# Patient Record
Sex: FEMALE | Race: WHITE | NOT HISPANIC OR LATINO | Employment: PART TIME | URBAN - METROPOLITAN AREA
[De-identification: names, ages, dates, MRNs, and addresses within clinical notes are randomized per-mention and may not be internally consistent; named-entity substitution may affect disease eponyms.]

---

## 2023-11-30 ENCOUNTER — OFFICE VISIT (OUTPATIENT)
Dept: PAIN MEDICINE | Facility: HOSPITAL | Age: 63
End: 2023-11-30
Payer: COMMERCIAL

## 2023-11-30 DIAGNOSIS — M54.89 VERTEBROGENIC PAIN: Primary | ICD-10-CM

## 2023-11-30 PROCEDURE — 99214 OFFICE O/P EST MOD 30 MIN: CPT | Performed by: ANESTHESIOLOGY

## 2023-11-30 PROCEDURE — 99204 OFFICE O/P NEW MOD 45 MIN: CPT | Performed by: ANESTHESIOLOGY
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
## 2023-12-28 ENCOUNTER — PREP FOR PROCEDURE (OUTPATIENT)
Dept: PAIN MEDICINE | Facility: HOSPITAL | Age: 63
End: 2023-12-28
Payer: COMMERCIAL

## 2023-12-28 DIAGNOSIS — M54.51 VERTEBROGENIC LOW BACK PAIN: Primary | ICD-10-CM

## 2024-01-18 ENCOUNTER — ANESTHESIA EVENT (OUTPATIENT)
Dept: OPERATING ROOM | Facility: HOSPITAL | Age: 64
End: 2024-01-18
Payer: COMMERCIAL

## 2024-01-19 ENCOUNTER — PREP FOR PROCEDURE (OUTPATIENT)
Dept: INTERNAL MEDICINE | Facility: HOSPITAL | Age: 64
End: 2024-01-19

## 2024-01-19 ENCOUNTER — HOSPITAL ENCOUNTER (OUTPATIENT)
Facility: HOSPITAL | Age: 64
Setting detail: OUTPATIENT SURGERY
Discharge: HOME | End: 2024-01-19
Attending: ANESTHESIOLOGY | Admitting: ANESTHESIOLOGY
Payer: COMMERCIAL

## 2024-01-19 ENCOUNTER — ANESTHESIA (OUTPATIENT)
Dept: OPERATING ROOM | Facility: HOSPITAL | Age: 64
End: 2024-01-19
Payer: COMMERCIAL

## 2024-01-19 ENCOUNTER — APPOINTMENT (OUTPATIENT)
Dept: RADIOLOGY | Facility: HOSPITAL | Age: 64
End: 2024-01-19
Payer: COMMERCIAL

## 2024-01-19 VITALS
RESPIRATION RATE: 13 BRPM | SYSTOLIC BLOOD PRESSURE: 140 MMHG | DIASTOLIC BLOOD PRESSURE: 75 MMHG | HEART RATE: 72 BPM | BODY MASS INDEX: 30.96 KG/M2 | WEIGHT: 216.27 LBS | OXYGEN SATURATION: 97 % | HEIGHT: 70 IN | TEMPERATURE: 97.9 F

## 2024-01-19 DIAGNOSIS — G89.18 POSTOPERATIVE PAIN: ICD-10-CM

## 2024-01-19 DIAGNOSIS — M54.51 VERTEBROGENIC LOW BACK PAIN: Primary | ICD-10-CM

## 2024-01-19 DIAGNOSIS — M54.16 LUMBAR RADICULITIS: ICD-10-CM

## 2024-01-19 PROBLEM — F41.9 ANXIETY: Status: ACTIVE | Noted: 2024-01-19

## 2024-01-19 PROBLEM — F32.A DEPRESSION: Status: ACTIVE | Noted: 2024-01-19

## 2024-01-19 PROBLEM — K22.70 BARRETT ESOPHAGUS: Status: ACTIVE | Noted: 2024-01-19

## 2024-01-19 PROBLEM — J45.909 ASTHMA (HHS-HCC): Status: ACTIVE | Noted: 2024-01-19

## 2024-01-19 PROBLEM — G89.4 CHRONIC PAIN DISORDER: Status: ACTIVE | Noted: 2024-01-19

## 2024-01-19 PROBLEM — J18.9 PNEUMONIA: Status: ACTIVE | Noted: 2024-01-19

## 2024-01-19 PROCEDURE — 2500000005 HC RX 250 GENERAL PHARMACY W/O HCPCS: Performed by: ANESTHESIOLOGIST ASSISTANT

## 2024-01-19 PROCEDURE — 7100000010 HC PHASE TWO TIME - EACH INCREMENTAL 1 MINUTE: Performed by: ANESTHESIOLOGY

## 2024-01-19 PROCEDURE — 2500000001 HC RX 250 WO HCPCS SELF ADMINISTERED DRUGS (ALT 637 FOR MEDICARE OP): Performed by: ANESTHESIOLOGY

## 2024-01-19 PROCEDURE — A64712 PR NEUROPLASTY SCIATIC NERVE,OPEN: Performed by: ANESTHESIOLOGIST ASSISTANT

## 2024-01-19 PROCEDURE — 2500000004 HC RX 250 GENERAL PHARMACY W/ HCPCS (ALT 636 FOR OP/ED): Performed by: ANESTHESIOLOGIST ASSISTANT

## 2024-01-19 PROCEDURE — 2500000005 HC RX 250 GENERAL PHARMACY W/O HCPCS: Performed by: ANESTHESIOLOGY

## 2024-01-19 PROCEDURE — 64629 TRML DSTRJ IOS BVN EA ADDL: CPT | Performed by: ANESTHESIOLOGY

## 2024-01-19 PROCEDURE — 64628 TRML DSTRJ IOS BVN 1ST 2 L/S: CPT | Performed by: ANESTHESIOLOGY

## 2024-01-19 PROCEDURE — 2500000004 HC RX 250 GENERAL PHARMACY W/ HCPCS (ALT 636 FOR OP/ED): Performed by: ANESTHESIOLOGY

## 2024-01-19 PROCEDURE — 7100000001 HC RECOVERY ROOM TIME - INITIAL BASE CHARGE: Performed by: ANESTHESIOLOGY

## 2024-01-19 PROCEDURE — 3700000001 HC GENERAL ANESTHESIA TIME - INITIAL BASE CHARGE: Performed by: ANESTHESIOLOGY

## 2024-01-19 PROCEDURE — 2720000007 HC OR 272 NO HCPCS: Performed by: ANESTHESIOLOGY

## 2024-01-19 PROCEDURE — 2500000002 HC RX 250 W HCPCS SELF ADMINISTERED DRUGS (ALT 637 FOR MEDICARE OP, ALT 636 FOR OP/ED): Performed by: ANESTHESIOLOGY

## 2024-01-19 PROCEDURE — 3600000001 HC OR TIME - INITIAL BASE CHARGE - PROCEDURE LEVEL ONE: Performed by: ANESTHESIOLOGY

## 2024-01-19 PROCEDURE — C1889 IMPLANT/INSERT DEVICE, NOC: HCPCS | Performed by: ANESTHESIOLOGY

## 2024-01-19 PROCEDURE — A4217 STERILE WATER/SALINE, 500 ML: HCPCS | Performed by: ANESTHESIOLOGY

## 2024-01-19 PROCEDURE — A64712 PR NEUROPLASTY SCIATIC NERVE,OPEN: Performed by: ANESTHESIOLOGY

## 2024-01-19 PROCEDURE — 3600000006 HC OR TIME - EACH INCREMENTAL 1 MINUTE - PROCEDURE LEVEL ONE: Performed by: ANESTHESIOLOGY

## 2024-01-19 PROCEDURE — 7100000002 HC RECOVERY ROOM TIME - EACH INCREMENTAL 1 MINUTE: Performed by: ANESTHESIOLOGY

## 2024-01-19 PROCEDURE — 7100000009 HC PHASE TWO TIME - INITIAL BASE CHARGE: Performed by: ANESTHESIOLOGY

## 2024-01-19 PROCEDURE — 76000 FLUOROSCOPY <1 HR PHYS/QHP: CPT | Mod: 59

## 2024-01-19 PROCEDURE — 3700000002 HC GENERAL ANESTHESIA TIME - EACH INCREMENTAL 1 MINUTE: Performed by: ANESTHESIOLOGY

## 2024-01-19 RX ORDER — LURASIDONE HYDROCHLORIDE 40 MG/1
40 TABLET, FILM COATED ORAL NIGHTLY
COMMUNITY

## 2024-01-19 RX ORDER — OXYCODONE HYDROCHLORIDE 5 MG/1
5 TABLET ORAL 3 TIMES DAILY PRN
Qty: 15 TABLET | Refills: 0 | Status: SHIPPED | OUTPATIENT
Start: 2024-01-19 | End: 2024-01-26 | Stop reason: SDUPTHER

## 2024-01-19 RX ORDER — LEVOTHYROXINE SODIUM 50 UG/1
50 TABLET ORAL
COMMUNITY

## 2024-01-19 RX ORDER — ONDANSETRON HYDROCHLORIDE 2 MG/ML
4 INJECTION, SOLUTION INTRAVENOUS ONCE AS NEEDED
Status: DISCONTINUED | OUTPATIENT
Start: 2024-01-19 | End: 2024-01-19 | Stop reason: HOSPADM

## 2024-01-19 RX ORDER — ONDANSETRON HYDROCHLORIDE 2 MG/ML
INJECTION, SOLUTION INTRAVENOUS AS NEEDED
Status: DISCONTINUED | OUTPATIENT
Start: 2024-01-19 | End: 2024-01-19

## 2024-01-19 RX ORDER — LIDOCAINE HYDROCHLORIDE 20 MG/ML
INJECTION, SOLUTION EPIDURAL; INFILTRATION; INTRACAUDAL; PERINEURAL AS NEEDED
Status: DISCONTINUED | OUTPATIENT
Start: 2024-01-19 | End: 2024-01-19

## 2024-01-19 RX ORDER — SUCRALFATE 1 G/1
1 TABLET ORAL
COMMUNITY

## 2024-01-19 RX ORDER — MIDAZOLAM HYDROCHLORIDE 1 MG/ML
INJECTION INTRAMUSCULAR; INTRAVENOUS AS NEEDED
Status: DISCONTINUED | OUTPATIENT
Start: 2024-01-19 | End: 2024-01-19

## 2024-01-19 RX ORDER — SODIUM CHLORIDE, SODIUM LACTATE, POTASSIUM CHLORIDE, CALCIUM CHLORIDE 600; 310; 30; 20 MG/100ML; MG/100ML; MG/100ML; MG/100ML
INJECTION, SOLUTION INTRAVENOUS CONTINUOUS PRN
Status: DISCONTINUED | OUTPATIENT
Start: 2024-01-19 | End: 2024-01-19

## 2024-01-19 RX ORDER — SODIUM CHLORIDE, SODIUM LACTATE, POTASSIUM CHLORIDE, CALCIUM CHLORIDE 600; 310; 30; 20 MG/100ML; MG/100ML; MG/100ML; MG/100ML
100 INJECTION, SOLUTION INTRAVENOUS CONTINUOUS
Status: DISCONTINUED | OUTPATIENT
Start: 2024-01-19 | End: 2024-01-19 | Stop reason: HOSPADM

## 2024-01-19 RX ORDER — PROPOFOL 10 MG/ML
INJECTION, EMULSION INTRAVENOUS CONTINUOUS PRN
Status: DISCONTINUED | OUTPATIENT
Start: 2024-01-19 | End: 2024-01-19

## 2024-01-19 RX ORDER — IPRATROPIUM BROMIDE AND ALBUTEROL SULFATE 2.5; .5 MG/3ML; MG/3ML
3 SOLUTION RESPIRATORY (INHALATION) ONCE AS NEEDED
Status: COMPLETED | OUTPATIENT
Start: 2024-01-19 | End: 2024-01-19

## 2024-01-19 RX ORDER — ACETAMINOPHEN 650 MG/1
650 SUPPOSITORY RECTAL ONCE AS NEEDED
Status: COMPLETED | OUTPATIENT
Start: 2024-01-19 | End: 2024-01-19

## 2024-01-19 RX ORDER — BUPIVACAINE HCL/EPINEPHRINE 0.5-1:200K
VIAL (ML) INJECTION AS NEEDED
Status: DISCONTINUED | OUTPATIENT
Start: 2024-01-19 | End: 2024-01-19 | Stop reason: HOSPADM

## 2024-01-19 RX ORDER — HYDROXYZINE PAMOATE 25 MG/1
50 CAPSULE ORAL NIGHTLY PRN
COMMUNITY

## 2024-01-19 RX ORDER — LAMOTRIGINE 150 MG/1
300 TABLET ORAL DAILY
COMMUNITY

## 2024-01-19 RX ORDER — OXYCODONE HYDROCHLORIDE 5 MG/1
5 TABLET ORAL EVERY 4 HOURS PRN
Status: DISCONTINUED | OUTPATIENT
Start: 2024-01-19 | End: 2024-01-19 | Stop reason: HOSPADM

## 2024-01-19 RX ORDER — CEFAZOLIN 1 G/1
INJECTION, POWDER, FOR SOLUTION INTRAVENOUS AS NEEDED
Status: DISCONTINUED | OUTPATIENT
Start: 2024-01-19 | End: 2024-01-19

## 2024-01-19 RX ORDER — CLONAZEPAM 1 MG/1
1 TABLET ORAL 2 TIMES DAILY PRN
COMMUNITY

## 2024-01-19 RX ORDER — TRAZODONE HYDROCHLORIDE 150 MG/1
300 TABLET ORAL NIGHTLY
COMMUNITY

## 2024-01-19 RX ORDER — HYDRALAZINE HYDROCHLORIDE 20 MG/ML
5 INJECTION INTRAMUSCULAR; INTRAVENOUS EVERY 30 MIN PRN
Status: DISCONTINUED | OUTPATIENT
Start: 2024-01-19 | End: 2024-01-19 | Stop reason: HOSPADM

## 2024-01-19 RX ORDER — BUSPIRONE HYDROCHLORIDE 15 MG/1
15 TABLET ORAL 2 TIMES DAILY
COMMUNITY

## 2024-01-19 RX ORDER — ONDANSETRON 4 MG/1
4 TABLET, ORALLY DISINTEGRATING ORAL EVERY 8 HOURS PRN
COMMUNITY

## 2024-01-19 RX ORDER — ACETAMINOPHEN 325 MG/1
975 TABLET ORAL ONCE AS NEEDED
Status: COMPLETED | OUTPATIENT
Start: 2024-01-19 | End: 2024-01-19

## 2024-01-19 RX ORDER — OMEPRAZOLE 40 MG/1
40 CAPSULE, DELAYED RELEASE ORAL
COMMUNITY

## 2024-01-19 RX ORDER — SERTRALINE HYDROCHLORIDE 50 MG/1
50 TABLET, FILM COATED ORAL DAILY
COMMUNITY

## 2024-01-19 RX ORDER — ACETAMINOPHEN 160 MG/5ML
650 SOLUTION ORAL ONCE AS NEEDED
Status: COMPLETED | OUTPATIENT
Start: 2024-01-19 | End: 2024-01-19

## 2024-01-19 RX ORDER — SODIUM CHLORIDE 0.9 G/100ML
IRRIGANT IRRIGATION AS NEEDED
Status: DISCONTINUED | OUTPATIENT
Start: 2024-01-19 | End: 2024-01-19 | Stop reason: HOSPADM

## 2024-01-19 RX ORDER — FENTANYL CITRATE 50 UG/ML
INJECTION, SOLUTION INTRAMUSCULAR; INTRAVENOUS AS NEEDED
Status: DISCONTINUED | OUTPATIENT
Start: 2024-01-19 | End: 2024-01-19

## 2024-01-19 RX ORDER — LIDOCAINE HYDROCHLORIDE 20 MG/ML
INJECTION, SOLUTION INFILTRATION; PERINEURAL AS NEEDED
Status: DISCONTINUED | OUTPATIENT
Start: 2024-01-19 | End: 2024-01-19 | Stop reason: HOSPADM

## 2024-01-19 RX ADMIN — PROPOFOL 100 MCG/KG/MIN: 10 INJECTION, EMULSION INTRAVENOUS at 09:12

## 2024-01-19 RX ADMIN — FENTANYL CITRATE 25 MCG: 50 INJECTION, SOLUTION INTRAMUSCULAR; INTRAVENOUS at 09:12

## 2024-01-19 RX ADMIN — HYDROMORPHONE HYDROCHLORIDE 0.5 MG: 1 INJECTION, SOLUTION INTRAMUSCULAR; INTRAVENOUS; SUBCUTANEOUS at 10:47

## 2024-01-19 RX ADMIN — OXYCODONE HYDROCHLORIDE 5 MG: 5 TABLET ORAL at 10:57

## 2024-01-19 RX ADMIN — LIDOCAINE HYDROCHLORIDE 100 MG: 20 INJECTION, SOLUTION EPIDURAL; INFILTRATION; INTRACAUDAL; PERINEURAL at 09:12

## 2024-01-19 RX ADMIN — ONDANSETRON 4 MG: 2 INJECTION INTRAMUSCULAR; INTRAVENOUS at 10:11

## 2024-01-19 RX ADMIN — MIDAZOLAM HYDROCHLORIDE 2 MG: 1 INJECTION, SOLUTION INTRAMUSCULAR; INTRAVENOUS at 09:03

## 2024-01-19 RX ADMIN — IPRATROPIUM BROMIDE AND ALBUTEROL SULFATE 3 ML: 2.5; .5 SOLUTION RESPIRATORY (INHALATION) at 11:38

## 2024-01-19 RX ADMIN — SODIUM CHLORIDE, POTASSIUM CHLORIDE, SODIUM LACTATE AND CALCIUM CHLORIDE: 600; 310; 30; 20 INJECTION, SOLUTION INTRAVENOUS at 09:02

## 2024-01-19 RX ADMIN — CEFAZOLIN 2 G: 1 INJECTION, POWDER, FOR SOLUTION INTRAMUSCULAR; INTRAVENOUS at 09:13

## 2024-01-19 RX ADMIN — ACETAMINOPHEN 975 MG: 325 TABLET ORAL at 11:51

## 2024-01-19 RX ADMIN — GLYCOPYRROLATE 0.1 MCG: 0.2 INJECTION, SOLUTION INTRAMUSCULAR; INTRAVITREAL at 09:03

## 2024-01-19 RX ADMIN — FENTANYL CITRATE 25 MCG: 50 INJECTION, SOLUTION INTRAMUSCULAR; INTRAVENOUS at 10:00

## 2024-01-19 RX ADMIN — Medication 2 L/MIN: at 10:21

## 2024-01-19 ASSESSMENT — PAIN SCALES - GENERAL
PAINLEVEL_OUTOF10: 3
PAINLEVEL_OUTOF10: 9
PAINLEVEL_OUTOF10: 3
PAINLEVEL_OUTOF10: 9
PAINLEVEL_OUTOF10: 3
PAINLEVEL_OUTOF10: 4
PAINLEVEL_OUTOF10: 3
PAINLEVEL_OUTOF10: 3

## 2024-01-19 ASSESSMENT — PAIN - FUNCTIONAL ASSESSMENT
PAIN_FUNCTIONAL_ASSESSMENT: 0-10
PAIN_FUNCTIONAL_ASSESSMENT: UNABLE TO SELF-REPORT
PAIN_FUNCTIONAL_ASSESSMENT: 0-10
PAIN_FUNCTIONAL_ASSESSMENT: UNABLE TO SELF-REPORT
PAIN_FUNCTIONAL_ASSESSMENT: 0-10

## 2024-01-19 ASSESSMENT — COLUMBIA-SUICIDE SEVERITY RATING SCALE - C-SSRS
1. IN THE PAST MONTH, HAVE YOU WISHED YOU WERE DEAD OR WISHED YOU COULD GO TO SLEEP AND NOT WAKE UP?: NO
6. HAVE YOU EVER DONE ANYTHING, STARTED TO DO ANYTHING, OR PREPARED TO DO ANYTHING TO END YOUR LIFE?: NO
2. HAVE YOU ACTUALLY HAD ANY THOUGHTS OF KILLING YOURSELF?: NO

## 2024-01-19 NOTE — OP NOTE
Neurolysis Spine Operative Note     Date: 2024  OR Location: [facility] A OR    Name: Aundrea Nelson, : 1960, Age: 63 y.o., MRN: [id number], Sex: female    Diagnosis  Pre-op Diagnosis     * Vertebrogenic low back pain [M54.51] Post-op Diagnosis     * Vertebrogenic low back pain [M54.51]     Procedures  Neurolysis Spine  50167 - [facility] DSTRJ INTRAOSSEOUS BVN 1ST 2 LMBR/SAC      Surgeons      * Kelsey Rosenbaum - Primary    Resident/Fellow/Other Assistant:  Surgeon(s) and Role:    Procedure Summary  Anesthesia: Monitor Anesthesia Care  ASA: II  Anesthesia Staff: Anesthesiologist: Avelina Bush MD  C-AA: VALENTINA Costa  Estimated Blood Loss: 5 mL  Intra-op Medications:   Medication Name Total Dose   BUPivacaine-EPINEPHrine (Marcaine w/EPI) 0.5 %-1:200,000 injection 5.5 mL   lidocaine (Xylocaine) 20 mg/mL (2 %) injection 5.5 mL   oxygen (O2) therapy 138 L              Anesthesia Record               Intraprocedure I/O Totals          Intake    Propofol Drip 0.00 mL    The total shown is the total volume documented since Anesthesia Start was filed.    LR infusion 500.00 mL    Total Intake 500 mL       Output    Est. Blood Loss 5 mL    Total Output 5 mL       Net    Net Volume 495 mL          Specimen: No specimens collected     Staff:   Circulator: Lorrie Dominguez RN  Relief Circulator: Niharika Diane RN  Relief Scrub: Trista Brooks  Scrub Person: Cristal Akbar         Drains and/or Catheters: * None in log *    Tourniquet Times:         Implants:     Findings: None    Indications: Aundrea Nelson is an 63 y.o. female who is having surgery for Vertebrogenic low back pain [M54.51].     The patient was seen in the preoperative area. The risks, benefits, complications, treatment options, non-operative alternatives, expected recovery and outcomes were discussed with the patient. The possibilities of reaction to medication, pulmonary aspiration, injury to surrounding structures, bleeding, recurrent  infection, the need for additional procedures, failure to diagnose a condition, and creating a complication requiring transfusion or operation were discussed with the patient. The patient concurred with the proposed plan, giving informed consent.  The site of surgery was properly noted/marked if necessary per policy. The patient has been actively warmed in preoperative area. Preoperative antibiotics have been ordered and given within 1 hours of incision.     Procedure Details:   Preoperative/postoperative diagnosis: Vertebrogenic pain, lumbosacral  Procedure: Basivertebral nerve ablation, Intracept procedure under fluoroscopic guidance at the L4, L5, and S1 levels  Surgeon: Kelsey Rosenbaum  Assistant: None  Anesthesia: Monitored anesthesia care  Complications: Apparently none    Procedure note: The patient was met in the preoperative holding area, risk, benefits, and alternatives to the procedure were discussed with the patient.  Informed written consent was obtained.  Patient was brought back to the procedure room and they positioned them self in a prone position and noted to be comfortable.  All pressure points were padded appropriately.  The patient's lumbosacral area was exposed, prepped, and draped in usual sterile fashion.  The trajectories to L4, L5, and S1 were identified.  The S1 pedicle was taking attention to first.  The left S1 pedicle was identified and the skin and subcutaneous tissues for trajectory of the needle/trocar was anesthetized using a local anesthetic mixture.  A 15 blade was used to make a stab incision and then a beveled tip stylette and trocar were inserted to touch the pedicle at the superior lateral border.  The to position was confirmed in the AP, oblique, and lateral views.  Once appropriate positioning was confirmed a mallet was utilized to enter the pedicle and frequently checked between the AP, oblique, and lateral views to get appropriate positioning and adjust the bevel tip  appropriately to access the vertebral body.  Once inside the vertebral body the curved cannula assembly was inserted and the nitinol stylette was inserted.  The nitinol stylette was tapped just across midline and then removed.  The radiofrequency probe was then inserted and seen just at the midline.  Positioning was checked in AP and lateral views and a 15-minute lesioning was done.  Same procedure was carried out on the right L5 pedicle utilizing same bevel tip and satisfactory positioning was done to again do a 7-minute lesion.  The left L4 pedicle was also accessed in the same fashion and lesion for 7 minutes.  All devices and probes were removed.  Dermabond was applied at the skin as well as sterile Telfa dressings.  The patient was taken the recovery room in stable condition.  Appropriate postoperative instructions were given and postoperative medication sent to her pharmacy.  We will follow-up in the next 7 to 10 days.  Complications:  None; patient tolerated the procedure well.    Disposition: PACU - hemodynamically stable.  Condition: stable         Additional Details:     Attending Attestation: I was present and scrubbed for the entire procedure.    Kelsey Rosenbaum  Phone Number: [phone]

## 2024-01-19 NOTE — ANESTHESIA POSTPROCEDURE EVALUATION
Patient: Aundrea Nelson    Procedure Summary       Date: 01/19/24 Room / Location: [facility] A OR 03 / [facility] OR    Anesthesia Start: 0902 Anesthesia Stop: 1026    Procedure: Neurolysis Spine Diagnosis:       Vertebrogenic low back pain      (Vertebrogenic low back pain [M54.51])    Surgeons: Kelsey Rosenbaum MD Responsible Provider: Avelina Bush MD    Anesthesia Type: MAC ASA Status: 2            Anesthesia Type: MAC    Vitals Value Taken Time   /75 01/19/24 1031   Temp 36.8 °C (98.2 °F) 01/19/24 1021   Pulse 71 01/19/24 1044   Resp 14 01/19/24 1030   SpO2 95 % 01/19/24 1044   Vitals shown include unvalidated device data.    Anesthesia Post Evaluation    Patient location during evaluation: bedside  Patient participation: complete - patient participated  Level of consciousness: awake  Pain management: satisfactory to patient  Multimodal analgesia pain management approach  Airway patency: patent  Cardiovascular status: stable  Respiratory status: spontaneous ventilation and unassisted  Hydration status: acceptable  Postoperative Nausea and Vomiting: none and mild        No notable events documented.

## 2024-01-19 NOTE — DISCHARGE INSTRUCTIONS
You had intracept procedure done today. The procedure went well. Pain medication prescription was sent to your pharmacy(Oxycodone). Please pick it up and take the medicine as needed. You may walk and move as tolerated, avoid lifting heavy objects, you may walk as tolerated.

## 2024-01-19 NOTE — ANESTHESIA PREPROCEDURE EVALUATION
Patient: Aundrea Nelson    Procedure Information       Date/Time: 24    Procedure: Neurolysis Spine    Location: [facility] OR  [facility] A OR    Surgeons: Kelsey Rosenbaum MD                                                    Pre-Anesthesia Evaluation      Aundrea Nelson is a 63 y.o. female who presents for procedure stated above.     Medical History reviewed  She has a past medical history of Anxiety, Asthma, Salomon esophagus, Cervical cancer (CMS/HCC), Chronic pain disorder, Depression, Personal history of other diseases of the digestive system, Personal history of other diseases of the digestive system, Personal history of other diseases of the digestive system, Personal history of other diseases of the musculoskeletal system and connective tissue, Personal history of other endocrine, nutritional and metabolic disease, and Pneumonia.    Surgical History reviewed   Past Surgical History:   Procedure Laterality Date    APPENDECTOMY  2013    Appendectomy     SECTION, CLASSIC  2013     Section    CHOLECYSTECTOMY  2013    Cholecystectomy    EYE SURGERY  2013    Eye Surgery    GASTRIC FUNDOPLICATION  2013    Esophagogastric Fundoplasty Nissen Fundoplication    OTHER SURGICAL HISTORY  2013    Repair Of Paraesophageal Hiatus Hernia By Laparotomy    OTHER SURGICAL HISTORY  2013    Parathyroid Complete Parathyroidectomy    TOTAL ABDOMINAL HYSTERECTOMY  2013    Total Abdominal Hysterectomy       Social History reviewed  She reports that she has quit smoking. Her smoking use included cigarettes. She has a 34.50 pack-year smoking history. She has never used smokeless tobacco. She reports that she does not currently use alcohol. She reports that she does not use drugs.    Allergies and Medications reviewed  Patient has no known allergies.    Current Outpatient Medications   Medication Instructions    busPIRone (BUSPAR) 15 mg, oral, 2 times daily  "   clonazePAM (KLONOPIN) 1 mg, oral, 2 times daily PRN    hydrOXYzine pamoate (VISTARIL) 50 mg, oral, Nightly PRN    lamoTRIgine (LAMICTAL) 300 mg, oral, Daily, At bedtime     levothyroxine (SYNTHROID, LEVOXYL) 50 mcg, oral, Daily before breakfast    lurasidone (LATUDA) 40 mg, oral, Nightly    omeprazole (PRILOSEC) 40 mg, oral, Daily before breakfast, Do not crush or chew.    ondansetron ODT (ZOFRAN-ODT) 4 mg, oral, Every 8 hours PRN    sertraline (ZOLOFT) 50 mg, oral, Daily    sucralfate (CARAFATE) 1 g, oral, 2 times daily before meals    traZODone (DESYREL) 300 mg, oral, Nightly       Relevant Results reviewed  No results found for: \"STAPHMRSASCR\"  No results found for: \"ABO\"  No results found for: \"ABORH\"      No results found for: \"WBC\", \"HGB\", \"HCT\", \"PLT\"    Chemistry    No results found for: \"NA\", \"K\", \"CL\", \"CO2\", \"BUN\", \"CREATININE\", \"GLU\" No results found for: \"CALCIUM\", \"ALKPHOS\", \"AST\", \"ALT\", \"BILITOT\"       No results found for: \"PROTIME\", \"INR\", \"PTT\"       Past Cardiology Tests (Last 3 Years):  EKG:  No results found for this or any previous visit.    Echo:  No results found for this or any previous visit.    No results found for this or any previous visit.  Ejection Fractions:  No results found for: \"EF\"  Cath:  No results found for this or any previous visit.    Stress Test:      No image results found.    Vitals  Visit Vitals  BP (!) 165/96   Pulse 76   Temp 36.4 °C (97.5 °F)   Resp 16   Ht 1.778 m (5' 10\")   Wt 98.1 kg (216 lb 4.3 oz)   SpO2 94%   BMI 31.03 kg/m²   Smoking Status Former   BSA 2.2 m²                         Relevant Problems   Neuro/Psych   (+) Anxiety   (+) Depression      Pulmonary   (+) Asthma   (+) Pneumonia      Musculoskeletal   (+) Chronic pain disorder       Clinical information reviewed:   Tobacco  Allergies  Meds  Problems  Med Hx  Surg Hx   Fam Hx  Soc   Hx        NPO Detail:  NPO/Void Status  Carbohydrate Drink Given Prior to Surgery? : N  Date of Last Liquid: " 01/19/24  Time of Last Liquid: 0500  Date of Last Solid: 01/18/24  Time of Last Solid: 1800  Last Intake Type: Clear fluids  Time of Last Void: 0530         Physical Exam    Airway  TM distance: >3 FB  Neck ROM: full     Cardiovascular   Rhythm: regular  Rate: normal     Dental   (+) upper dentures, lower dentures     Pulmonary   Comments: Non labored respiration   Abdominal            Anesthesia Plan    History of general anesthesia?: yes  History of complications of general anesthesia?: no    ASA 2     MAC     intravenous induction   Anesthetic plan and risks discussed with patient.    Plan discussed with CRNA and CAA.

## 2024-01-19 NOTE — H&P
History Of Present Illness  Aundrea Nelson is a 63 y.o. female presenting with with chronic back pain.     Past Medical History  Past Medical History:   Diagnosis Date    Anxiety     Asthma     Salomon esophagus     Cervical cancer (CMS/HCC)     Chronic pain disorder     Depression     Personal history of other diseases of the digestive system     History of esophageal reflux    Personal history of other diseases of the digestive system     History of hiatal hernia    Personal history of other diseases of the digestive system     History of chronic cholecystitis    Personal history of other diseases of the musculoskeletal system and connective tissue     History of low back pain    Personal history of other endocrine, nutritional and metabolic disease     History of hypercholesterolemia    Pneumonia        Surgical History  Past Surgical History:   Procedure Laterality Date    APPENDECTOMY  2013    Appendectomy     SECTION, CLASSIC  2013     Section    CHOLECYSTECTOMY  2013    Cholecystectomy    EYE SURGERY  2013    Eye Surgery    GASTRIC FUNDOPLICATION  2013    Esophagogastric Fundoplasty Nissen Fundoplication    OTHER SURGICAL HISTORY  2013    Repair Of Paraesophageal Hiatus Hernia By Laparotomy    OTHER SURGICAL HISTORY  2013    Parathyroid Complete Parathyroidectomy    TOTAL ABDOMINAL HYSTERECTOMY  2013    Total Abdominal Hysterectomy        Social History  She reports that she has quit smoking. Her smoking use included cigarettes. She has a 34.50 pack-year smoking history. She has never used smokeless tobacco. She reports that she does not currently use alcohol. She reports that she does not use drugs.    Family History  No family history on file.     Allergies  Patient has no known allergies.    Review of Systems  13 point review of systems complete is negative unless stated above in the HPI    Physical Exam  Gen.: Patient appears to be stated  "age, fair hygiene  Eyes: Sclera clear, patient with a right eye lid stitches  ENT: Hearing is grossly intact, external ears and nose appear to be without deformity or rash. No lesions or masses noted.  Neck: No JVD noted, tracheal position is midline  Respiratory: No gasping or shortness of breath noted, no use of accessory muscles noted  Cardiovascular: Extremity show no edema or varicosities  Lymph: No lymphadenopathy noted in the anterior cervical regions bilaterally  Skin no rashes or open lesions or ulcers identified on the skin  Musculoskeletal: Gait is grossly normal  Neurologic: Cranial nerves II through XII are grossly intact  Psychiatric:  Patient is alert and oriented x3     Last Recorded Vitals  Blood pressure (!) 165/96, pulse 76, temperature 36.4 °C (97.5 °F), resp. rate 16, height 1.778 m (5' 10\"), weight 98.1 kg (216 lb 4.3 oz), SpO2 94 %.    Relevant Results         Assessment/Plan   Principal Problem:    Vertebrogenic low back pain    63-year-old female with a history of chronic low back pain who presents today for the aforementioned procedure.  Procedure, risk, benefits, alternatives reviewed with the patient will address the L4, L5, and S1 levels.       Kelsey Rosenbaum MD    "

## 2024-01-19 NOTE — PERIOPERATIVE NURSING NOTE
1021 Arrival to PACU. Remains sedated from anesthesia but easily arousable.     1036 Daughter updated via text message.     1045 Arouses spontaneously. STEPHENS x4, moderate and equal strength bilaterally. Denies numbness/tingling. PPP x4, 2+. Extremities x4 pink, warm with brisk cap refill. Complaining of left back pain 9/10. Ice pack applied.     1047 Medicated for pain with Dilaudid. Tolerating PO.     1057 Oxycodone given for pain. Tolerating PO juice and crackers.     1115 States pain is down to 3-4/10. Attempted to put patient on room air, sats dropped to mid 80's. Patient states she had Covid in December and Pneumonia 3 weeks ago. Denies SOB. Lungs CTA. No cough. Instructed on use of IS. Able to inspire up to 1500mL. Sats up to 97% on 2L NC after IS. Patient states her sats are usually low, anywhere from 90-94%.     1123 Daughter updated via phone call.     1130 Dr. Bush updated on patient sats. DuoNeb treatment ordered.    1144 Dr. Rosenbaum at bedside to speak to patient.     1151 Tylenol given for pain. Patient on room air.     1224 Meets criteria for Phase 2. Has been on room air for 30 minutes with no desats. Tolerating PO. States pain is down to 3-4/10. O2 sat on room air currently 96%.

## 2024-01-22 RX ORDER — METHYLPREDNISOLONE 4 MG/1
TABLET ORAL
Qty: 21 TABLET | Refills: 0 | Status: SHIPPED | OUTPATIENT
Start: 2024-01-22 | End: 2024-01-29

## 2024-01-22 RX ORDER — OXYCODONE HYDROCHLORIDE 5 MG/1
5 TABLET ORAL EVERY 6 HOURS PRN
Qty: 28 TABLET | Refills: 0 | Status: SHIPPED | OUTPATIENT
Start: 2024-01-22 | End: 2024-01-29

## 2024-01-22 ASSESSMENT — PAIN SCALES - GENERAL: PAINLEVEL_OUTOF10: 5 - MODERATE PAIN

## 2024-01-24 ENCOUNTER — HOSPITAL ENCOUNTER (OUTPATIENT)
Dept: RADIOLOGY | Facility: CLINIC | Age: 64
Discharge: HOME | End: 2024-01-24
Payer: COMMERCIAL

## 2024-01-24 DIAGNOSIS — M54.16 LUMBAR RADICULITIS: ICD-10-CM

## 2024-01-24 PROCEDURE — 72148 MRI LUMBAR SPINE W/O DYE: CPT

## 2024-01-24 PROCEDURE — 72148 MRI LUMBAR SPINE W/O DYE: CPT | Performed by: STUDENT IN AN ORGANIZED HEALTH CARE EDUCATION/TRAINING PROGRAM

## 2024-01-25 DIAGNOSIS — M54.16 LUMBAR RADICULITIS: ICD-10-CM

## 2024-01-25 DIAGNOSIS — M54.51 VERTEBROGENIC LOW BACK PAIN: ICD-10-CM

## 2024-01-26 ENCOUNTER — HOSPITAL ENCOUNTER (OUTPATIENT)
Dept: RADIOLOGY | Facility: HOSPITAL | Age: 64
Discharge: HOME | End: 2024-01-26
Payer: COMMERCIAL

## 2024-01-26 VITALS
OXYGEN SATURATION: 92 % | RESPIRATION RATE: 18 BRPM | TEMPERATURE: 97.6 F | HEART RATE: 64 BPM | BODY MASS INDEX: 30.06 KG/M2 | HEIGHT: 70 IN | SYSTOLIC BLOOD PRESSURE: 130 MMHG | DIASTOLIC BLOOD PRESSURE: 74 MMHG | WEIGHT: 210 LBS

## 2024-01-26 DIAGNOSIS — G89.18 POSTOPERATIVE PAIN: ICD-10-CM

## 2024-01-26 DIAGNOSIS — M54.16 LUMBAR RADICULITIS: ICD-10-CM

## 2024-01-26 PROCEDURE — 2500000004 HC RX 250 GENERAL PHARMACY W/ HCPCS (ALT 636 FOR OP/ED): Performed by: ANESTHESIOLOGY

## 2024-01-26 PROCEDURE — 2720000007 HC OR 272 NO HCPCS

## 2024-01-26 PROCEDURE — 77003 FLUOROGUIDE FOR SPINE INJECT: CPT

## 2024-01-26 PROCEDURE — 64483 NJX AA&/STRD TFRM EPI L/S 1: CPT | Mod: LT | Performed by: ANESTHESIOLOGY

## 2024-01-26 PROCEDURE — 64483 NJX AA&/STRD TFRM EPI L/S 1: CPT | Performed by: ANESTHESIOLOGY

## 2024-01-26 RX ORDER — OXYCODONE HYDROCHLORIDE 5 MG/1
5 TABLET ORAL 3 TIMES DAILY PRN
Qty: 15 TABLET | Refills: 0 | Status: SHIPPED | OUTPATIENT
Start: 2024-01-26 | End: 2024-01-31

## 2024-01-26 RX ORDER — MIDAZOLAM HYDROCHLORIDE 1 MG/ML
INJECTION INTRAMUSCULAR; INTRAVENOUS
Status: COMPLETED | OUTPATIENT
Start: 2024-01-26 | End: 2024-01-26

## 2024-01-26 RX ADMIN — MIDAZOLAM HYDROCHLORIDE 2 MG: 1 INJECTION INTRAMUSCULAR; INTRAVENOUS at 11:15

## 2024-01-26 ASSESSMENT — PAIN SCALES - GENERAL
PAINLEVEL_OUTOF10: 5 - MODERATE PAIN
PAINLEVEL_OUTOF10: 7
PAINLEVEL_OUTOF10: 8
PAINLEVEL_OUTOF10: 9
PAINLEVEL_OUTOF10: 8

## 2024-01-26 ASSESSMENT — PAIN - FUNCTIONAL ASSESSMENT
PAIN_FUNCTIONAL_ASSESSMENT: WONG-BAKER FACES
PAIN_FUNCTIONAL_ASSESSMENT: WONG-BAKER FACES
PAIN_FUNCTIONAL_ASSESSMENT: 0-10

## 2024-01-26 NOTE — PROCEDURES
Preoperative diagnosis:  Lumbar radiculitis  Postoperative diagnosis:  Lumbar radiculitis, left foraminal stenosis  Procedure: Left L5 lumbar transforaminal epidural steroid injection under fluoroscopic guidance  Surgeon: Kelsey Rosenbaum  Assistant:  Fellow, Syd Whipple  Anesthesia: Local, IV sedation    Complications: Apparently none    Clinical note: Aundrea is a 63-year-old female with a history of back and left leg pain.  She has known foraminal stenosis.  We updated her MRI which shows severe left-sided foraminal stenosis at L5 which correlates with her current symptomatology.    Procedure note: The patient was met in the preoperative holding area after risks benefits and alternatives to procedure were discussed with the patient, informed consent was obtained. Patient brought back to the procedure room and placed in the prone position on the fluoroscopy table. Area over the back was exposed, prepped, draped, in the usual sterile fashion.  Skin and subcutaneous tissues to the neuroforamen was anesthetized using 0.5% lidocaine.  A 120 mm 22-gauge Sprotte needle was inserted in the skin and advanced into the foramen. Needle tip position was confirmed in AP oblique and lateral view.  Contrast was injected which showed appropriate epidural spread, no intravascular or intrathecal uptake. A total of 1 mL of 0.5% lidocaine mixed with 10 mg dexamethasone was injected. Needle removed, bandage applied, patient tolerated the procedure well with no immediate complications.    Patient noted that this injection reproduced her typical pain.

## 2024-01-26 NOTE — H&P
History Of Present Illness  Aundrae Nelson is a 63 y.o. female presents for procedure state below. Endorses no changes in past medical history or medical health since last seen in clinic.     Past Medical History  She has a past medical history of Anxiety, Asthma, Salomon esophagus, Cervical cancer (CMS/HCC), Chronic pain disorder, Depression, Personal history of other diseases of the digestive system, Personal history of other diseases of the digestive system, Personal history of other diseases of the digestive system, Personal history of other diseases of the musculoskeletal system and connective tissue, Personal history of other endocrine, nutritional and metabolic disease, and Pneumonia.    Surgical History  She has a past surgical history that includes Gastric fundoplication (2013);  section, classic (2013); Appendectomy (2013); Other surgical history (2013); Total abdominal hysterectomy (2013); Eye surgery (2013); Cholecystectomy (2013); and Other surgical history (2013).     Social History  She reports that she has quit smoking. Her smoking use included cigarettes. She has a 34.50 pack-year smoking history. She has never used smokeless tobacco. She reports that she does not currently use alcohol. She reports that she does not use drugs.    Family History  No family history on file.     Allergies  Patient has no known allergies.    Review of Symptoms:   Constitutional: Negative for chills, diaphoresis or fever  HENT: Negative for neck swelling  Eyes:.  Negative for eye pain  Respiratory:.  Negative for cough, shortness of breath or wheezing    Cardiovascular:.  Negative for chest pain or palpitations  Gastrointestinal:.  Negative for abdominal pain, nausea and vomiting  Genitourinary:.  Negative for urgency  Musculoskeletal:  Positive for back pain. Positive for joint pain. Denies falls within the past 3 months.  Skin: Negative for wounds or itching    Neurological: Negative for dizziness, seizures, loss of consciousness and weakness  Endo/Heme/Allergies: Does not bruise/bleed easily  Psychiatric/Behavioral: Negative for depression. The patient does not appear anxious.       PHYSICAL EXAM  Vitals signs reviewed  Constitutional:       General: Not in acute distress     Appearance: Normal appearance. Not ill-appearing.  HENT:     Head: Normocephalic and atraumatic  Eyes:     Conjunctiva/sclera: Conjunctivae normal  Cardiovascular:     Rate and Rhythm: Normal rate and regular rhythm  Pulmonary:     Effort: No respiratory distress  Abdominal:     Palpations: Abdomen is soft  Musculoskeletal: STEPHENS  Skin:     General: Skin is warm and dry  Neurological:     General: No focal deficit present  Psychiatric:         Mood and Affect: Mood normal         Behavior: Behavior normal     Last Recorded Vitals  /71   Pulse 71   Temp 35.8 °C (96.5 °F) (Skin)   Resp 16   Wt 95.3 kg (210 lb)   SpO2 96%     Relevant Results  Current Outpatient Medications   Medication Instructions    busPIRone (BUSPAR) 15 mg, oral, 2 times daily    clonazePAM (KLONOPIN) 1 mg, oral, 2 times daily PRN    hydrOXYzine pamoate (VISTARIL) 50 mg, oral, Nightly PRN    lamoTRIgine (LAMICTAL) 300 mg, oral, Daily, At bedtime     levothyroxine (SYNTHROID, LEVOXYL) 50 mcg, oral, Daily before breakfast    lurasidone (LATUDA) 40 mg, oral, Nightly    methylPREDNISolone (Medrol Dospak) 4 mg tablets Follow schedule on package instructions    omeprazole (PRILOSEC) 40 mg, oral, Daily before breakfast, Do not crush or chew.    ondansetron ODT (ZOFRAN-ODT) 4 mg, oral, Every 8 hours PRN    oxyCODONE (ROXICODONE) 5 mg, oral, Every 6 hours PRN    sertraline (ZOLOFT) 50 mg, oral, Daily    sucralfate (CARAFATE) 1 g, oral, 2 times daily before meals    traZODone (DESYREL) 300 mg, oral, Nightly         MR lumbar spine wo IV contrast 01/24/2024    Narrative  Interpreted By:  Maximo Fernandes,  STUDY:  MR LUMBAR SPINE WO  IV CONTRAST;  1/24/2024 4:58 pm    INDICATION:  Signs/Symptoms: pain.    COMPARISON:  None.    ACCESSION NUMBER(S):  [id number]    ORDERING CLINICIAN:  ALEXIS DECKER    TECHNIQUE:  Sagittal T1, T2, STIR, axial T1 and T2 weighted images of the lumbar  spine were acquired.    FINDINGS:  There are 5 lumbar-type vertebral bodies, the last well-formed disc  space is labeled L5-S1.    Alignment: The vertebral alignment is maintained.    Vertebrae/Intervertebral Discs: The vertebral bodies demonstrate  expected height. A T1 and T2 hypointense nodule at L4 is consistent  with an area of benign sclerosis. Heterogeneous T1 signal intensity  nodule at S1 with surrounding T2 hyperintense signal and a mildly T1  hypointense nodule at L5 with associated T2 hyperintense signal are  indeterminate. There is nonspecific edema in the right L5 pedicle.  Multilevel degenerative endplate changes without associated marrow  edema. Multilevel loss of normal disc T2 signal intensity and disc  height.    Conus: The lower thoracic cord appears unremarkable. The conus  terminates at L1. The cauda equina is unremarkable.    T12-L1:  No canal or foraminal narrowing.    L1-2: Disc bulge and facet hypertrophy. No canal or foraminal  narrowing.    L2-3: Asymmetric disc bulge and facet hypertrophy with mild narrowing  of the left subarticular recess.    L3-4: Disc bulge, facet hypertrophy, and ligamentum flavum thickening  with mild narrowing of both subarticular recesses.    L4-5: Asymmetric disc bulge, facet hypertrophy, ligamentum flavum  thickening, and prominent epidural fat with diffuse mild spinal canal  narrowing and mild left and moderate right neural foramen narrowing.    L5-S1: Asymmetric disc bulge, facet hypertrophy, and ligamentum  flavum thickening with mild narrowing of both subarticular recesses  and mild right and severe left neural foramen narrowing.    Paraspinal muscular atrophy, the paraspinal soft tissues are  otherwise  unremarkable.    Impression  1. Indeterminate marrow lesions at L5 and S1 as described above,  recommend comparison with prior outside imaging and/or follow-up CT  for further characterization.  2. Edema in the right L5 pedicle, nonspecific but possibly related to  a stress reaction.  3. Degenerative changes most pronounced in the lower lumbar spine.    MACRO:  None    Signed by: Maximo Fernandes 1/24/2024 5:12 PM  Dictation workstation:   [id number]      XR cervical spine 2-3 views 12/07/2022    Narrative  Patient Name: SERA DE LA ROSA  MRN: [id number]  Acct#: [id number]  Accession: [id number]  Exam Date/Time: 12/07/2022 10:32  Procedure: XR CERVICAL SPINE 2-3 VIEWS  Ordering Provider: KEARNS CHRISTINA  Reason For Exam:      CERVICAL SPINE SERIES    CLINICAL INDICATION:  neck pain    AP, lateral, and odontoid views of the cervical spine were obtained.    COMPARISON: None.    FINDINGS:    There is grade 1 anterolisthesis at C3-4 secondary to advanced facet arthrosis bilaterally. Cervical alignment is otherwise anatomic. Advanced facet degenerative changes are also noted at C2-3. No vertebral compression deformity or prevertebral soft tissue swelling is seen.    There is minor disc degeneration in the mid and lower cervical spine with small endplate osteophytes. The lung apices are clear aside from right apical pleural thickening which is chronic.    Impression  Advanced degenerative facet arthrosis in the upper cervical spine with grade 1 degenerative anterolisthesis at C3-4.    Report Dictated on Workstation: [id number]  Electronically Signed By: Sergey Cook  Electronically Signed Date/Time: 12/8/2022 8:13 AM EST      XR hip right 2 or 3 views     Narrative  Patient Name:                SERA DE LA ROSA      MRN:                         [id number]      Acct#:                       [id number]      Diagnostic Radiology      ACCESSION                 EXAM DATE/TIME           PROCEDURE                 ORDERING  PROVIDER  [id number]             9/26/2022 15:17 EDT      CR Hip w/ Pelvis 2 or 3   MD THELMA, MIKAL  Views Right n      CPT code  64385      Reason For Exam  (CR Hip w/ Pelvis 2 or 3 Views Right n) M16.0, Bilateral primary osteoarthritis of hip    Report    CLINICAL INDICATION:  Right hip pain.    TECHNIQUE: AP view the pelvis, AP weightbearing view of the right hip, lateral  view the right hip.    COMPARISON:  None.    FINDINGS/IMPRESSION:    Limitations: No significant limitations.    No acute fracture or dislocation. Relatively mild osteoarthritis involving the  right and left hip. 5 mm subtle ossific/calcific density along the superior  aspect of the right greater trochanter is nonspecific but could be seen in  setting of calcific tendinosis. There is osteitis pubis. Degenerative changes in  the sacroiliac joints. Degenerative spondylosis in the visualized lower lumbar  spine. Calcified phleboliths. Questionable faint chain suture material in the  right lower quadrant.      Report Dictated on Workstation: [id number]    ---** Final ---**      Dictating Physician: MD BROWN VLADIMIR  Signed Date and Time: 09/27/2022 11:24 am  Signed by: MD BROWN VLADIMIR  Transcribed Date and Time: 09/27/2022 11:25     No image results found.       1. Lumbar radiculitis  FL pain management TC    FL pain management TC    Transforaminal    Transforaminal           ASSESSMENT/PLAN  Aundrea Nelson is a 63 y.o. female presents for left L5 transforaminal epidural steroid injection    Our plan is as follows:  - Follow In pain clinic  - Continue to participate in physical therapy as well as physician directed home exercises  - Continue pain medications as prescribed       Syd Whipple MD

## 2024-01-29 ENCOUNTER — APPOINTMENT (OUTPATIENT)
Dept: RADIOLOGY | Facility: HOSPITAL | Age: 64
End: 2024-01-29
Payer: COMMERCIAL

## 2024-01-30 ENCOUNTER — TELEMEDICINE (OUTPATIENT)
Dept: PAIN MEDICINE | Facility: HOSPITAL | Age: 64
End: 2024-01-30
Payer: COMMERCIAL

## 2024-01-30 DIAGNOSIS — M54.51 VERTEBROGENIC LOW BACK PAIN: Primary | ICD-10-CM

## 2024-01-30 PROCEDURE — 99024 POSTOP FOLLOW-UP VISIT: CPT | Performed by: ANESTHESIOLOGY

## 2024-01-30 NOTE — PROGRESS NOTES
Chief Complaint:  Back and leg pain, post op    HPI   Aundrea is a 63-year-old female with a history of low back pain which has been chronic and longstanding for many years.  The patient was last evaluated on 1/26/2024 at which time she underwent a left-sided transforaminal at L5.  She also had basivertebral nerve ablation on 1/19/2024.  The patient after her surgery was struggling with left-sided leg pain.  She had discomfort in an L5 type distribution.  She was evaluated in the emergency room on 1/20/2024 and was not deemed to have any neurologic deficits but just complains of left sided radicular pain.  Patient had ongoing discomfort despite a number of interventions from a medication standpoint and we planned for a an MRI to evaluate etiology of pain.  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  The patient says that before the procedure she really struggled with only right-sided leg pain intermittently and had injections for that.  Following the MRI we did a left-sided L5 transforaminal which she says has improved her pain by 50% since it started.  The most dramatic relief thus far has been with the epidural.  She is taking intermittent opioid medication in addition to her typical medications.  She will be going out of town starting tomorrow and this is her postoperative appointment.  She has no concerns at the surgical site.  She notes ongoing back pain/procedural discomfort.    ROS: 13 point review of systems is complete and is negative listed above in HPI    Imaging:  FINDINGS:  There are 5 lumbar-type vertebral bodies, the last well-formed disc  space is labeled L5-S1.      Alignment: The vertebral alignment is maintained.      Vertebrae/Intervertebral Discs: The vertebral bodies demonstrate  expected height. A T1 and T2 hypointense nodule at L4 is consistent  with an area of benign sclerosis. Heterogeneous T1 signal  intensity  nodule at S1 with surrounding T2 hyperintense signal and a mildly T1  hypointense nodule at L5 with associated T2 hyperintense signal are  indeterminate. There is nonspecific edema in the right L5 pedicle.  Multilevel degenerative endplate changes without associated marrow  edema. Multilevel loss of normal disc T2 signal intensity and disc  height.      Conus: The lower thoracic cord appears unremarkable. The conus  terminates at L1. The cauda equina is unremarkable.      T12-L1:  No canal or foraminal narrowing.      L1-2: Disc bulge and facet hypertrophy. No canal or foraminal  narrowing.      L2-3: Asymmetric disc bulge and facet hypertrophy with mild narrowing  of the left subarticular recess.      L3-4: Disc bulge, facet hypertrophy, and ligamentum flavum thickening  with mild narrowing of both subarticular recesses.      L4-5: Asymmetric disc bulge, facet hypertrophy, ligamentum flavum  thickening, and prominent epidural fat with diffuse mild spinal canal  narrowing and mild left and moderate right neural foramen narrowing.      L5-S1: Asymmetric disc bulge, facet hypertrophy, and ligamentum  flavum thickening with mild narrowing of both subarticular recesses  and mild right and severe left neural foramen narrowing.      Paraspinal muscular atrophy, the paraspinal soft tissues are  otherwise unremarkable.      IMPRESSION:  1. Indeterminate marrow lesions at L5 and S1 as described above,  recommend comparison with prior outside imaging and/or follow-up CT  for further characterization.  2. Edema in the right L5 pedicle, nonspecific but possibly related to  a stress reaction.  3. Degenerative changes most pronounced in the lower lumbar spine.    Physical Exam:  Gen.: Patient appears to be stated age, no acute distress, sitting comfortably during interview    Impression/Plan:  63-year-old female with a history of low back pain which has been chronic and now is status post basivertebral nerve ablation  done on 1/19/2024.  She does have foraminal narrowing which is severe and her updated MRI did not show any fractures or acute issues outside of some edema at the right L5 pedicle which was accessed for the procedure.  Patient does not have right-sided pain.  When she came in for her injection physical exam outside of straight leg raise was negative, no neurodeficits.    -Discussed with the patient as we have discussed previously, she has known stenosis on the left at the neuroforamen of L5-S1 which could have been irritated/agitated with positioning or the reverberations from the procedure, being in a hospital bed, or possibly nothing specific aggravating the pain.  She has this underlying issue which has been there even since her prior imaging.  She has seen 50% improvement after an injection and we may need to repeat a second injection to get this pain under control or at any point she could be referred to a surgeon.  We did discuss surgical referral as a potential option which she declined to take the referral for.  Advised to reach out at any time if she would like to pursue this.    -Will continue to keep a close eye on how her back does.  Encouraged core strengthening and an ongoing exercise program.  Advised to follow-up or send us messages in regards to improvement in pain/function at 1 and 3 months post procedure.

## 2024-02-01 RX ORDER — OXYCODONE HYDROCHLORIDE 5 MG/1
5 TABLET ORAL 3 TIMES DAILY PRN
Qty: 15 TABLET | Refills: 0 | Status: SHIPPED | OUTPATIENT
Start: 2024-02-01 | End: 2024-02-06

## (undated) DEVICE — NEEDLE, SPINAL, QUINCKE, LUER LOCK, 18 G X 6 IN

## (undated) DEVICE — DRAPE COVER, C ARM, FLOUROSCAN IMAGING SYS

## (undated) DEVICE — NEEDLE, HYPODERMIC, 25 G X 1.5 IN, A BEVEL, STERILE

## (undated) DEVICE — Device

## (undated) DEVICE — INTRACEPT, RF PROBE

## (undated) DEVICE — INTRACEPT, ADDITIONAL ACCESS INSTRUMENT

## (undated) DEVICE — ADHESIVE, SKIN, LIQUIBAND EXCEED

## (undated) DEVICE — APPLICATOR, CHLORAPREP, W/ORANGE TINT, 26ML

## (undated) DEVICE — DRAPE, SHEET, UTILITY, NON ABSORBENT, 18 X 26 IN, LF

## (undated) DEVICE — INTRACEPT, ACCESS INSTRUMENT

## (undated) DEVICE — GLOVE, SURGICAL, PROTEXIS PI MICRO, 6.5, PF, LF

## (undated) DEVICE — COVER, C-ARM W/CLIPS, OEC GE

## (undated) DEVICE — GLOVE, SURGICAL, PROTEXIS PI BLUE W/NEUTHERA, 6.5, PF, LF